# Patient Record
Sex: FEMALE | Race: WHITE | NOT HISPANIC OR LATINO | URBAN - METROPOLITAN AREA
[De-identification: names, ages, dates, MRNs, and addresses within clinical notes are randomized per-mention and may not be internally consistent; named-entity substitution may affect disease eponyms.]

---

## 2021-04-27 ENCOUNTER — EMERGENCY (EMERGENCY)
Facility: HOSPITAL | Age: 51
LOS: 0 days | Discharge: HOME | End: 2021-04-27
Attending: EMERGENCY MEDICINE
Payer: COMMERCIAL

## 2021-04-27 VITALS
OXYGEN SATURATION: 98 % | RESPIRATION RATE: 16 BRPM | HEART RATE: 87 BPM | TEMPERATURE: 98 F | HEIGHT: 62 IN | DIASTOLIC BLOOD PRESSURE: 82 MMHG | WEIGHT: 134.92 LBS | SYSTOLIC BLOOD PRESSURE: 117 MMHG

## 2021-04-27 DIAGNOSIS — T24.201A BURN OF SECOND DEGREE OF UNSPECIFIED SITE OF RIGHT LOWER LIMB, EXCEPT ANKLE AND FOOT, INITIAL ENCOUNTER: ICD-10-CM

## 2021-04-27 DIAGNOSIS — T20.26XA BURN OF SECOND DEGREE OF FOREHEAD AND CHEEK, INITIAL ENCOUNTER: ICD-10-CM
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
## 2021-04-27 DIAGNOSIS — T22.00XA BURN OF UNSPECIFIED DEGREE OF SHOULDER AND UPPER LIMB, EXCEPT WRIST AND HAND, UNSPECIFIED SITE, INITIAL ENCOUNTER: ICD-10-CM

## 2021-04-27 DIAGNOSIS — T22.231A BURN OF SECOND DEGREE OF RIGHT UPPER ARM, INITIAL ENCOUNTER: ICD-10-CM

## 2021-04-27 PROCEDURE — 99291 CRITICAL CARE FIRST HOUR: CPT

## 2021-04-27 RX ORDER — BACITRACIN ZINC 500 UNIT/G
1 OINTMENT IN PACKET (EA) TOPICAL
Qty: 90 | Refills: 0
Start: 2021-04-27 | End: 2021-05-03

## 2021-04-27 RX ORDER — TETANUS TOXOID, REDUCED DIPHTHERIA TOXOID AND ACELLULAR PERTUSSIS VACCINE, ADSORBED 5; 2.5; 8; 8; 2.5 [IU]/.5ML; [IU]/.5ML; UG/.5ML; UG/.5ML; UG/.5ML
0.5 SUSPENSION INTRAMUSCULAR ONCE
Refills: 0 | Status: COMPLETED | OUTPATIENT
Start: 2021-04-27 | End: 2021-04-27

## 2021-04-27 RX ORDER — CEPHALEXIN 500 MG
1 CAPSULE ORAL
Qty: 28 | Refills: 0
Start: 2021-04-27 | End: 2021-05-03

## 2021-04-27 RX ORDER — BACITRACIN 50000 [IU]/1
300 INJECTION, POWDER, FOR SOLUTION INTRAMUSCULAR
Qty: 28 | Refills: 0
Start: 2021-04-27

## 2021-04-27 RX ADMIN — TETANUS TOXOID, REDUCED DIPHTHERIA TOXOID AND ACELLULAR PERTUSSIS VACCINE, ADSORBED 0.5 MILLILITER(S): 5; 2.5; 8; 8; 2.5 SUSPENSION INTRAMUSCULAR at 19:38
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
## 2021-04-27 NOTE — ED PROVIDER NOTE - NSFOLLOWUPINSTRUCTIONS_ED_ALL_ED_FT
Take tylenol if you have pain.  Keep area clean.  Wash the area with warm water when showering dry and apply bacitracin (to face) and silvadene (to places below the neck).  Take prescription medication as instructed.    Follow-up with plastics.
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
## 2021-04-27 NOTE — ED PROVIDER NOTE - IV ALTEPLASE EXCL REL HIDDEN
Hilario Gân:M?t Cá Chân [Sprain: Ankle, With X-Ray]  Hilario rojas là m?t ch? n th??ng n ? i dây ch?ng ho?c carmencita gi? các kh?p l?i v?i nhau. Không có b? gãy x ?? ng. Ph?n l?n hilario rojas m?t t? mina ? ?n b?n tu?n l? m?i lành. N?u dây ch?ng b? harrison lynne (bong gân tr?m tr?ng), có th? m?t taniya?u tháng m?i bình ph?c.    Hilario gân t? nh? ? ?n v?a có th? ?i ?u tr? v? i b?ng qu ?n giãn ho?c n?p nikhil giày ? ? nâng ? ? và phòng ng?a b? ch? n th??ng l ?i. Hilario rojas nh? có th? không c? n nâng ? ? thêm. Hilario rojas tr?m tr?ng có th? c?n gi?i ph? u ? ? h?i ph?c.  Ch?m Sóc T?i Betzaida:  1) D? ?ng c?ng chân b? t? n th??ng càng taniya ?u càng t? t cho ? ?n khi quý v? có th? ?i l ?i mà không th? y ?au. N?u b? ?au taniya ? u khi ?i l ?i thì có th? cho dùng n?ng ho?c khung t? p ?i. (Nh?ng d?ng c? này có th? thuê ho?c jocelin t?i taniya?u nhà thu?c ho?c các ti?m cung c?p d?ng c? gi?i ph?u hay ch?nh hình). Tuân fausto l?i khuyên c?a bác s? quý v? v? vi?c lúc nào thì b? t ? ?u ch?u s?c trên c? ng chân ?ó.  2) Nâng c?ng chân c?a quý v? almeida lên ? ? gi? m ?au và s?ng. Khi ng?, kê m?t cái g? i d? ?i c?ng chân b? ch?n th??ng. Khi ng?i, nâng ? ? c?ng chân b? ch? n th??ng ? ? cho nó ngang v?i eo c?a quý v?. ?i ?u này r?t laurel tr?ng nikhil vòng 48 gi? ? ?u tiên.  3) ? ?p m? t cái túi n? ? c ?á (b ? ?á c ?c vào nikhil m?t cái túi plastic, b?c nikhil m? t cái kh?n) lên trên vùng b ? ch?n th??ng nikhil 20 phút cách m?i 1-2 gi? nikhil ngày ? ?u tiên. Quý v? có th? ? ?t túi n ? ? c ?á tr ?c ti?p lên trên n?p/ch? b?ng b ?t. N?u quý v? ?? ?c c?p m?t lo?i giày ?ng, hãy tháo nó ra ? ? ? ? p túi n? ? c ?á . Ti?p t? c ch? ? m túi n? ? c ?á 3-4 l ?n m?i ngày nikhil mina ngày k? ti? p, layne ?ó n ?u th?y c? n ? ? gi? m ?au và s?ng.  4) Quý v? có th? dùng acetaminophen (Tylenol) ho? c ibuprofen (Motrin, Advil) ? ? ki? m soát c?n ?au, tr ? khi ? ã ?? ?c cho dùng m?t lo?i thu?c ch? ng ?au khác. [L?U Ý: N?u quý v? b? b?nh maricarmen ho?c th?n mãn tính ho?c t?ng b? loét faby t? (stomach ulcer) ho?c mosqueda?t jacobo?  t ?? ?ng tiêu hóa (GI bleeding), hãy bàn v?i bác s? c?a quý v? tr? ?c khi dùng các lo?i thu?c này.]  5) Quý v? có th? ch?i th ? tyree tr? l?i layne khi lành, khi nào quý v? có th? ch? y mà không ?au .  6) M?t cá chân b? bong gân có nguy c? almeida b? ch? n th??ng tr? l?i adonis sáu tu?n l? ? ?u. Adonis th? i jeb ?ó , hãy b?o v? m?t cá chân c?a quý v? v?i m?t n?p adonis giày, phòng ng?a m?t cá chân c?a quý v? nghiêng cecil l?i. ?i ?u này r?t laurel tr?ng n?u quý v? làm công vi?c l inh ? ?ng ho? c ch?i th ? tyree adonis th? i jeb ?ó .     Ti?p T?c Gagan Dõi  v?i bác s? c?a quý v? ho?c nh? ? ã h ? ?ng d?n n?u quý v? không b? t ? ?u th?y ? ? h?n adonis n?m ngày k? ti?p.  [L?U Ý: N? u ? ã ch?p bill christine?n, bác s? chuyên mamie bill christine?n s? xem l?i vi?c này. Quý v? s? ?? ?c thông báo v? b?t k? khám phá m?i nào có th? ? nh h? ? ng ? ?n vi? c ch?m sóc c ?a quý v?.]  N?u x?y ra b?t c? ?i?u nào layne ?ây hãy L?P T?C ?I?U TR? Y T?:  -- B?ng b ?t ho?c maritza n?p b? ? ?t ho?c m?m  -- B?ng b ?t b?ng s?i th?y zane ho?c maritza n?p b? ? ?t và không khô adonis 24 gi?  -- ?au ho ?c s?ng bette t?ng , ho?c v? t ? ? mosqueda?t hi?n  -- Các ngón chân b? l?nh, có màu xanh, tê ho?c bu?t  -- Tái ch? n th??ng n?i m ?t cá chân c?a quý v?  © 8422-9796 [employer]. [address] [place] [address], [place] [address]. All rights reserved. This information is not intended as a substitute for professional medical care. Always follow your healthcare professional's instructions.          B?m Gi?p Ph?n Chi Th? Bên D??i  Quý v? b? gi?p (b?m tím) ph?n chi th? bên d??i (c?ng chân, ??u g?i, m?t cá chân, bàn chân, ho?c ngtrever chân). Các tri?u ch?ng faby g?m ?au ??n, s?ng, và da b? ??i màu. Không b? gãy x??ng. Th??ng tích này c?n ph?i m?t t? vài ngày t?i vài tu?n m?i lành.  Adonis th?i jeb ?ó, ch? b?m tím th??ng ??i t? màu h?i ?? sang xanh tím, sang vàng h?i xanh, layne ?ó cheng?n thành nâu vàng.  Ch?m sóc t?i bette  · Tr? khi ???c cho toa m?t lo?i thu?dano bernal, chanel v? có th? deloris  acetaminophen, ibuprofen, ho?c naproxen ?? ki?m ch? c?n ?au. (N?u quý v? b? b?nh maricarmen ho?c th?n mãn tính ho?c ?ã t?ng b? loét faby t? ho?c ch?y máu nikhil ???ng tiêu hoá, hãy bàn v?i bác s? c?a quý v? tr??c khi dùng các thu?c này.)  · Nâng vùng b? th??ng lên almeida ?? gi?m ?au và s?ng. Ng?i ho?c n?m mosqueda?ng v?i vùng b? th??ng nâng lên ngang t?m trái anthony c?a quý v?, càng taniya?u càng t?t. ?i?u này ??c bi?t laurel tr?ng nikhil 48 gi? ??u.  · ??p n??c ?á cho vùng b? th??ng ?? giúp gi?m ?au và s?ng. Bó consuelo?n làm l?nh (túi n??c ?á ho?c các viên n??c ?á nikhil m?t túi nh?a) nikhil m?t kh?n mead m?ng. ??p lên vùng b? gi?p nikhil 20 phút m?i 1 t?i 2 gi? nikhil ngày ??u. Ti?p t?c làm ?i?u này t? 3 t?i 4 l?n m?t ngày cho t?i khi c?n ?au và s?ng bi?n m?t.  · N?u ???c khuyên là nên ch?ng n?ng, không ?è tr?n s?c n?ng lên c?ng jeniffer b? th??ng tr? khi quý v? có th? làm ???c ?i?u này mà không ?au. Quý v? có th? tr? l?i ch?i th? tyree khi có th? ?è tr?n s?c n?ng lên và tác ??ng lên c?ng chân b? th??ng mà không ?au.  Gagan dõi  Khám gagan dõi v?i nhân viên y t? c?a quý v? ho?c nhân viên c?a chúng tôi nh? ?ã ???c c?n d?n. Hãy g?i n?u quý v? không b?t nikhil t? 1 t?i 2 tu?n.  Khi nào ?i tìm s? ch?m sóc y t?   G?i nhân viên y t? ngay n?u quý v? b? b?t c? nh?ng ?i?u nào layne ?ây:  · Bette t?ng ?au ho?c s?ng  · Bàn chân ho?c các ngón chân b? l?nh, có màu xanh, tê ho?c ng?a ran  · Các d?u hi?u c?a taniya?m trùng: ?m, ti?t d?ch, ho?c bette t?ng n?i ?? ho?c ?au xung quanh ch? b? th??ng tích  · Không th? di cheng?n ???c vùng b? th??ng   · B?m gi?p th??ng bonnie olson do  Date Last Reviewed: 4/24/2015  © 3256-7520 [employer]. [address] [place] [address]. T?t c? các bradly?n ???c b?o l?u. Thông tin này không nh?m thay th? cho d?ch v? ch?m sóc y t? ada tính chuyên môn. C?n luôn tuân gagan s? ch? d?n t? chuyên bette ch?m sóc s?c jacquelin? c?a quý v?.        Lower Extremity Contusion  You have a contusion (bruise) of a lower extremity (leg, knee,  ankle, foot, or toe). Symptoms include pain, swelling, and skin discoloration. No bones are broken. This injury may take from a few days to a few weeks to heal.  During that time, the bruise may change from reddish in color, to purple-blue, to green-yellow, to yellow-brown.  Home care  · Unless another medicine was prescribed, you can take acetaminophen, ibuprofen, or naproxen to control pain. (If you have chronic liver or kidney disease or ever had a stomach ulcer or gastrointestinal bleeding, talk with your doctor before using these medicines.)  · Elevate the injured area to reduce pain and swelling. As much as possible, sit or lie down with the injured area raised about the level of your heart. This is especially important during the first 48 hours.  · Ice the injured area to help reduce pain and swelling. Wrap a cold source (ice pack or ice cubes in a plastic bag) in a thin towel. Apply to the bruised area for 20 minutes every 1 to 2 hours the first day. Continue this 3 to 4 times a day until the pain and swelling goes away.  · If crutches have been advised, do not bear full weight on the injured leg until you can do so without pain. You may return to sports when you are able to put full weight and impact on the injured leg without pain.  Follow up  Follow up with your healthcare provider or our staff as advised. Call if you are not improving within the next 1 to 2 weeks.  When to seek medical advice   Call your healthcare provider right away if any of these occur:  · Increased pain or swelling  · Foot or toes become cold, blue, numb or tingly  · Signs of infection: Warmth, drainage, or increased redness or pain around the injury  · Inability to move the injured area   · Frequent bruising for unknown reasons  Date Last Reviewed: 2/1/2017  © 1644-3152 [employer]. [address] [facility], [place] [address]. All rights reserved. This information is not intended as a substitute for professional medical  show care. Always follow your healthcare professional's instructions.        Ankle Sprain, with X-Ray   A sprain is an injury to the ligaments that hold the ankle joint together. There are no broken bones. Most sprains take about 4 to 6 weeks to heal. A severe sprain, where the ligament is completely torn, can take several months to recover.  Mild to moderate sprains may be treated with an elastic wrap or an in-shoe splint. This will provide support and prevent re-injury. A mild sprain may not need any additional support. A severe sprain may require surgery to repair. In some cases a cast or boot may be needed.  Home care  · Stay off your injured ankle as much as possible until you can walk on it without pain. If you have a lot of pain with walking, then crutches or a walker may be prescribed. These can be rented or purchased at many pharmacies and surgical or orthopedic supply stores. Follow your healthcare provider’s advice about when to begin bearing weight on that leg.  · Keep your leg elevated to reduce pain and swelling. When sleeping, place a pillow under your injured ankle. When sitting, support your injured ankle and leg so they are level with your waist. This is very important during the first 48 hours.  · Place an ice pack over the injured area for no more than 20 minutes. Do this every 3 to 6 hours for the first 24 to 48 hours, or as instructed. To make an ice pack, put ice cubes in a plastic bag that seals at the top. Wrap the bag in a clean, thin towel or cloth. You can place the ice pack directly over the wrap, splint, or cast. Never place an ice pack directly on your skin. As the ice melts, be careful not to get any wrap, splint, or cast wet. Keep using ice packs as needed to ease pain and swelling.    · If you have a boot, open it to apply an ice pack, unless told otherwise by your provider. Wrap the ice pack in a clean, thin towel or cloth. Never put an ice pack directly on your skin.  · After 48 hours,  it may also be helpful to apply heat for no more than 20 minutes, several times a day. You can do this with a heating pad or warm compress. Or you may want to go back and forth between using ice and heat. Never apply heat directly to the skin. Always wrap the heating pad or warm compress in a clean, thin towel or cloth.  · You may use over-the-counter pain medicine to ease pain, unless another pain medicine was prescribed. Talk with your provider before using these medicines if you have chronic liver or kidney disease, or have ever had a stomach ulcer or GI (gastrointestinal) bleeding.  · You may return to sports after your ankle heals, when you can run without pain.  · You are at risk of getting injured again for the first 6 weeks. During that time, protect your ankle with an in-shoe splint that prevents your ankle from tilting side to side. This is very important if you do active work or play sports during that time.  Follow-up care  Any X-rays you had today don’t show any broken bones, breaks, or fractures. Bruises and sprains can sometimes hurt as much as a fracture. These injuries can take time to heal completely. If your symptoms don’t improve or they get worse, talk with your healthcare provider. You may need a repeat X-ray.  When to seek medical advice  Call your healthcare provider right away if any of these occur:  · The plaster cast or splint gets wet or soft  · The fiberglass cast or splint gets wet and does not dry for 24 hours  · The pain or swelling increases, or redness appears  · The cast has a bad smell  · Fever of 100.4 F (38 C) or higher, or as directed  · Your toes become cold, blue, numb, or tingly  · You re-injure your ankle  © 5261-0983 [employer]. [address] [place] [address], [place] [address]. All rights reserved. This information is not intended as a substitute for professional medical care. Always follow your healthcare professional's instructions.

## 2021-04-27 NOTE — ED PROVIDER NOTE - CARE PROVIDER_API CALL
Jessica Valenzuela (MD)  Plastic Surgery  [address] [place] [address], [address]  [place], [place] [id number]  Phone: [phone]  Fax: [phone]  Follow Up Time:

## 2021-04-27 NOTE — ED PROVIDER NOTE - ENMT, MLM
Airway patent  no soot noted, Nasal mucosa clear. Mouth with normal mucosa. Throat has no vesicles, no oropharyngeal exudates and uvula is midline.
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
## 2021-04-27 NOTE — ED PROVIDER NOTE - PROGRESS NOTE DETAILS
Dr. Valenzuela returned call.  He recommends bacitracin above the neck and silvadene below.  Thin layer applied no need to place dressing.  Patient can follow-up in the office if not requiring to be transferred.

## 2021-04-27 NOTE — ED PROVIDER NOTE - PATIENT PORTAL LINK FT
You can access the FollowMyHealth Patient Portal offered by [facility] by registering at the following website: [email or web address]. By joining [facility]’s FollowMyHealth portal, you will also be able to view your health information using other applications (apps) compatible with our system.

## 2021-04-28 RX ORDER — ACETAMINOPHEN 500 MG
2 TABLET ORAL
Qty: 40 | Refills: 0
Start: 2021-04-28 | End: 2021-05-02

## 2021-04-28 RX ORDER — TRAMADOL HYDROCHLORIDE 50 MG/1
1 TABLET ORAL
Qty: 12 | Refills: 0
Start: 2021-04-28 | End: 2021-04-30

## 2024-12-03 NOTE — ED ADULT TRIAGE NOTE - HEIGHT IN CM
157.48
What Stage Is The Melanoma?: Stage 0
What Is The Reason For Today's Visit?: History of Melanoma
Year Excised?: 2023

## 2025-07-03 ENCOUNTER — EMERGENCY (EMERGENCY)
Facility: HOSPITAL | Age: 55
LOS: 0 days | Discharge: HOME | End: 2025-07-03
Attending: EMERGENCY MEDICINE
Payer: COMMERCIAL

## 2025-07-03 VITALS
DIASTOLIC BLOOD PRESSURE: 81 MMHG | RESPIRATION RATE: 18 BRPM | TEMPERATURE: 99 F | OXYGEN SATURATION: 99 % | SYSTOLIC BLOOD PRESSURE: 121 MMHG

## 2025-07-03 VITALS — HEART RATE: 86 BPM | OXYGEN SATURATION: 99 %
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
## 2025-07-03 DIAGNOSIS — S81.011A LACERATION WITHOUT FOREIGN BODY, RIGHT KNEE, INITIAL ENCOUNTER: ICD-10-CM
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
## 2025-07-03 PROBLEM — Z78.9 OTHER SPECIFIED HEALTH STATUS: Chronic | Status: ACTIVE | Noted: 2021-04-27

## 2025-07-03 PROCEDURE — 99283 EMERGENCY DEPT VISIT LOW MDM: CPT

## 2025-07-03 NOTE — ED PROVIDER NOTE - PATIENT PORTAL LINK FT
You can access the FollowMyHealth Patient Portal offered by [facility] by registering at the following website: [email or web address]. By joining [facility]’s FollowMyHealth portal, you will also be able to view your health information using other applications (apps) compatible with our system.

## 2025-07-03 NOTE — ED ADULT TRIAGE NOTE - CHIEF COMPLAINT QUOTE
pt  c/o laceration on right knee after falling and  her  knee struck the wooden deck. pt concerned about wood or nails in the wound. no history. pt  c/o laceration on right knee after falling and landing on her knee the wooden deck today. pt concerned about wood or nails in the wound. no history.

## 2025-07-03 NOTE — ED PROVIDER NOTE - NS ED MD DISPO DISCHARGE
Home DISPLAY PLAN FREE TEXT DISPLAY PLAN FREE TEXT DISPLAY PLAN FREE TEXT DISPLAY PLAN FREE TEXT DISPLAY PLAN FREE TEXT DISPLAY PLAN FREE TEXT
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
## 2025-07-03 NOTE — ED ADULT NURSE NOTE - NSFALLUNIVINTERV_ED_ALL_ED
Bed/Stretcher in lowest position, wheels locked, appropriate side rails in place/Call bell, personal items and telephone in reach/Instruct patient to call for assistance before getting out of bed/chair/stretcher/Non-slip footwear applied when patient is off stretcher/Longs to call system/Physically safe environment - no spills, clutter or unnecessary equipment/Purposeful proactive rounding/Room/bathroom lighting operational, light cord in reach

## 2025-07-03 NOTE — ED PROVIDER NOTE - PHYSICAL EXAMINATION
Law:  General: No distress.  Mentation at baseline.   HEENT: WNL  Chest/Lungs: CTAB, No wheeze, No retractions, No increased work of breathing, Normal rate  Heart: S1S2 RRR, No M/R/G, Pules equal Bilaterally in upper and lower extremities distally  Abd: soft, NT/ND, No guarding, No rebound.  No hernias, no palpable masses.  Extrem: FROM in all joints, no significant edema noted, No ulcers.  Cap refil < 2sec.  Skin: Laceration to right knee.  Washed out and inspected.  no FB.  no sutures needed.  Neuro:  Grossly normal.  No difficulty ambulating. No focal deficits.  Psychiatric: No evidence of delusions. No SI/HI.

## 2025-07-03 NOTE — ED ADULT NURSE NOTE - CHIEF COMPLAINT QUOTE
pt  c/o laceration on right knee after falling and landing on her knee the wooden deck today. pt concerned about wood or nails in the wound. no history.